# Patient Record
Sex: MALE | Race: OTHER | NOT HISPANIC OR LATINO | URBAN - METROPOLITAN AREA
[De-identification: names, ages, dates, MRNs, and addresses within clinical notes are randomized per-mention and may not be internally consistent; named-entity substitution may affect disease eponyms.]

---

## 2017-09-08 ENCOUNTER — EMERGENCY (EMERGENCY)
Facility: HOSPITAL | Age: 42
LOS: 1 days | Discharge: HOME | End: 2017-09-08
Attending: EMERGENCY MEDICINE | Admitting: EMERGENCY MEDICINE
Payer: COMMERCIAL

## 2017-09-08 VITALS
RESPIRATION RATE: 20 BRPM | DIASTOLIC BLOOD PRESSURE: 100 MMHG | OXYGEN SATURATION: 96 % | TEMPERATURE: 98 F | HEIGHT: 69 IN | SYSTOLIC BLOOD PRESSURE: 144 MMHG | HEART RATE: 113 BPM | WEIGHT: 210.1 LBS

## 2017-09-08 DIAGNOSIS — M25.60 STIFFNESS OF UNSPECIFIED JOINT, NOT ELSEWHERE CLASSIFIED: ICD-10-CM

## 2017-09-08 DIAGNOSIS — S60.221A CONTUSION OF RIGHT HAND, INITIAL ENCOUNTER: ICD-10-CM
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
## 2017-09-08 DIAGNOSIS — S00.81XA ABRASION OF OTHER PART OF HEAD, INITIAL ENCOUNTER: ICD-10-CM

## 2017-09-08 DIAGNOSIS — Z79.899 OTHER LONG TERM (CURRENT) DRUG THERAPY: ICD-10-CM
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
## 2017-09-08 PROCEDURE — 99283 EMERGENCY DEPT VISIT LOW MDM: CPT

## 2017-09-08 RX ORDER — IBUPROFEN 200 MG
1 TABLET ORAL
Qty: 30 | Refills: 0
Start: 2017-09-08

## 2017-09-08 RX ORDER — IBUPROFEN 200 MG
800 TABLET ORAL ONCE
Qty: 0 | Refills: 0 | Status: COMPLETED | OUTPATIENT
Start: 2017-09-08 | End: 2017-09-08

## 2017-09-08 RX ADMIN — Medication 800 MILLIGRAM(S): at 21:34
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
## 2017-09-08 NOTE — ED ADULT NURSE NOTE - CHPI ED SYMPTOMS NEG
no difficulty bearing weight/no stiffness/no deformity/no weakness/no bruising/no fever/no numbness/no abrasion/no back pain/no tingling
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
## 2017-09-08 NOTE — ED PROVIDER NOTE - MUSCULOSKELETAL, MLM
No midline c spine tenderness. mild swelling of R hand, FROM of all IP joints, nontender to all MCPs and all phalanges. pt able to extend wrist, perform pincher movements, intrinsic muscles of hands intact

## 2020-07-28 ENCOUNTER — INPATIENT (INPATIENT)
Facility: HOSPITAL | Age: 45
LOS: 0 days | Discharge: HOME | DRG: 489 | End: 2020-07-29
Attending: ORTHOPAEDIC SURGERY | Admitting: ORTHOPAEDIC SURGERY
Payer: COMMERCIAL

## 2020-07-28 ENCOUNTER — TRANSCRIPTION ENCOUNTER (OUTPATIENT)
Age: 45
End: 2020-07-28

## 2020-07-28 VITALS
DIASTOLIC BLOOD PRESSURE: 97 MMHG | TEMPERATURE: 99 F | HEIGHT: 69 IN | RESPIRATION RATE: 16 BRPM | HEART RATE: 104 BPM | OXYGEN SATURATION: 96 % | SYSTOLIC BLOOD PRESSURE: 153 MMHG | WEIGHT: 220.02 LBS

## 2020-07-28 DIAGNOSIS — Q68.2 CONGENITAL DEFORMITY OF KNEE: ICD-10-CM

## 2020-07-28 DIAGNOSIS — S86.811A STRAIN OF OTHER MUSCLE(S) AND TENDON(S) AT LOWER LEG LEVEL, RIGHT LEG, INITIAL ENCOUNTER: ICD-10-CM

## 2020-07-28 LAB
ALBUMIN SERPL ELPH-MCNC: 4.3 G/DL — SIGNIFICANT CHANGE UP (ref 3.3–5.2)
ALP SERPL-CCNC: 48 U/L — SIGNIFICANT CHANGE UP (ref 40–120)
ALT FLD-CCNC: 23 U/L — SIGNIFICANT CHANGE UP
ANION GAP SERPL CALC-SCNC: 14 MMOL/L — SIGNIFICANT CHANGE UP (ref 5–17)
APTT BLD: 28.1 SEC — SIGNIFICANT CHANGE UP (ref 27.5–35.5)
AST SERPL-CCNC: 23 U/L — SIGNIFICANT CHANGE UP
BASOPHILS # BLD AUTO: 0.04 K/UL — SIGNIFICANT CHANGE UP (ref 0–0.2)
BASOPHILS NFR BLD AUTO: 0.5 % — SIGNIFICANT CHANGE UP (ref 0–2)
BILIRUB SERPL-MCNC: 0.4 MG/DL — SIGNIFICANT CHANGE UP (ref 0.4–2)
BLD GP AB SCN SERPL QL: SIGNIFICANT CHANGE UP
BUN SERPL-MCNC: 12 MG/DL — SIGNIFICANT CHANGE UP (ref 8–20)
CALCIUM SERPL-MCNC: 8.9 MG/DL — SIGNIFICANT CHANGE UP (ref 8.6–10.2)
CHLORIDE SERPL-SCNC: 99 MMOL/L — SIGNIFICANT CHANGE UP (ref 98–107)
CO2 SERPL-SCNC: 24 MMOL/L — SIGNIFICANT CHANGE UP (ref 22–29)
CREAT SERPL-MCNC: 1.15 MG/DL — SIGNIFICANT CHANGE UP (ref 0.5–1.3)
EOSINOPHIL # BLD AUTO: 0.08 K/UL — SIGNIFICANT CHANGE UP (ref 0–0.5)
EOSINOPHIL NFR BLD AUTO: 1 % — SIGNIFICANT CHANGE UP (ref 0–6)
GLUCOSE SERPL-MCNC: 98 MG/DL — SIGNIFICANT CHANGE UP (ref 70–99)
HCT VFR BLD CALC: 49.2 % — SIGNIFICANT CHANGE UP (ref 39–50)
HGB BLD-MCNC: 16.1 G/DL — SIGNIFICANT CHANGE UP (ref 13–17)
IMM GRANULOCYTES NFR BLD AUTO: 0.1 % — SIGNIFICANT CHANGE UP (ref 0–1.5)
INR BLD: 1.01 RATIO — SIGNIFICANT CHANGE UP (ref 0.88–1.16)
LYMPHOCYTES # BLD AUTO: 1.72 K/UL — SIGNIFICANT CHANGE UP (ref 1–3.3)
LYMPHOCYTES # BLD AUTO: 21.4 % — SIGNIFICANT CHANGE UP (ref 13–44)
MCHC RBC-ENTMCNC: 27.2 PG — SIGNIFICANT CHANGE UP (ref 27–34)
MCHC RBC-ENTMCNC: 32.7 GM/DL — SIGNIFICANT CHANGE UP (ref 32–36)
MCV RBC AUTO: 83 FL — SIGNIFICANT CHANGE UP (ref 80–100)
MONOCYTES # BLD AUTO: 0.68 K/UL — SIGNIFICANT CHANGE UP (ref 0–0.9)
MONOCYTES NFR BLD AUTO: 8.5 % — SIGNIFICANT CHANGE UP (ref 2–14)
NEUTROPHILS # BLD AUTO: 5.49 K/UL — SIGNIFICANT CHANGE UP (ref 1.8–7.4)
NEUTROPHILS NFR BLD AUTO: 68.5 % — SIGNIFICANT CHANGE UP (ref 43–77)
PLATELET # BLD AUTO: 246 K/UL — SIGNIFICANT CHANGE UP (ref 150–400)
POTASSIUM SERPL-MCNC: 3.8 MMOL/L — SIGNIFICANT CHANGE UP (ref 3.5–5.3)
POTASSIUM SERPL-SCNC: 3.8 MMOL/L — SIGNIFICANT CHANGE UP (ref 3.5–5.3)
PROT SERPL-MCNC: 6.9 G/DL — SIGNIFICANT CHANGE UP (ref 6.6–8.7)
PROTHROM AB SERPL-ACNC: 11.7 SEC — SIGNIFICANT CHANGE UP (ref 10.6–13.6)
RBC # BLD: 5.93 M/UL — HIGH (ref 4.2–5.8)
RBC # FLD: 15 % — HIGH (ref 10.3–14.5)
SARS-COV-2 RNA SPEC QL NAA+PROBE: SIGNIFICANT CHANGE UP
SODIUM SERPL-SCNC: 137 MMOL/L — SIGNIFICANT CHANGE UP (ref 135–145)
WBC # BLD: 8.02 K/UL — SIGNIFICANT CHANGE UP (ref 3.8–10.5)
WBC # FLD AUTO: 8.02 K/UL — SIGNIFICANT CHANGE UP (ref 3.8–10.5)

## 2020-07-28 PROCEDURE — 99222 1ST HOSP IP/OBS MODERATE 55: CPT | Mod: 57

## 2020-07-28 PROCEDURE — 73562 X-RAY EXAM OF KNEE 3: CPT | Mod: 26,RT

## 2020-07-28 PROCEDURE — 99285 EMERGENCY DEPT VISIT HI MDM: CPT

## 2020-07-28 PROCEDURE — 93010 ELECTROCARDIOGRAM REPORT: CPT

## 2020-07-28 PROCEDURE — 71045 X-RAY EXAM CHEST 1 VIEW: CPT | Mod: 26

## 2020-07-28 RX ORDER — SODIUM CHLORIDE 9 MG/ML
1000 INJECTION, SOLUTION INTRAVENOUS
Refills: 0 | Status: DISCONTINUED | OUTPATIENT
Start: 2020-07-28 | End: 2020-07-29

## 2020-07-28 RX ORDER — OXYCODONE AND ACETAMINOPHEN 5; 325 MG/1; MG/1
1 TABLET ORAL ONCE
Refills: 0 | Status: DISCONTINUED | OUTPATIENT
Start: 2020-07-28 | End: 2020-07-28

## 2020-07-28 RX ORDER — DIPHENHYDRAMINE HCL 50 MG
25 CAPSULE ORAL EVERY 4 HOURS
Refills: 0 | Status: DISCONTINUED | OUTPATIENT
Start: 2020-07-28 | End: 2020-07-29

## 2020-07-28 RX ORDER — CEFAZOLIN SODIUM 1 G
2000 VIAL (EA) INJECTION ONCE
Refills: 0 | Status: COMPLETED | OUTPATIENT
Start: 2020-07-29 | End: 2020-07-29

## 2020-07-28 RX ORDER — OXYCODONE HYDROCHLORIDE 5 MG/1
10 TABLET ORAL
Refills: 0 | Status: DISCONTINUED | OUTPATIENT
Start: 2020-07-28 | End: 2020-07-29

## 2020-07-28 RX ORDER — OXYCODONE HYDROCHLORIDE 5 MG/1
5 TABLET ORAL
Refills: 0 | Status: DISCONTINUED | OUTPATIENT
Start: 2020-07-28 | End: 2020-07-29

## 2020-07-28 RX ORDER — KETOROLAC TROMETHAMINE 30 MG/ML
30 SYRINGE (ML) INJECTION ONCE
Refills: 0 | Status: DISCONTINUED | OUTPATIENT
Start: 2020-07-28 | End: 2020-07-28

## 2020-07-28 RX ORDER — KETOROLAC TROMETHAMINE 30 MG/ML
60 SYRINGE (ML) INJECTION ONCE
Refills: 0 | Status: DISCONTINUED | OUTPATIENT
Start: 2020-07-28 | End: 2020-07-28

## 2020-07-28 RX ADMIN — Medication 60 MILLIGRAM(S): at 21:10

## 2020-07-28 RX ADMIN — OXYCODONE AND ACETAMINOPHEN 1 TABLET(S): 5; 325 TABLET ORAL at 21:11

## 2020-07-28 RX ADMIN — Medication 30 MILLIGRAM(S): at 21:10

## 2020-07-28 RX ADMIN — Medication 25 MILLIGRAM(S): at 21:20

## 2020-07-28 NOTE — ED PROVIDER NOTE - EXTREMITY EXAM
Rt knee swelling with mild ecchymosis, ttp ROM limited to pain, patella palpable in superior position

## 2020-07-28 NOTE — ED ADULT TRIAGE NOTE - CHIEF COMPLAINT QUOTE
"I was trying to get away from a bee and I slammed my knee into a boat trailer and dislocated it." Received patient at 1835 on stretcher on 3 liters of O2 via nc v/s obtained, patient denies pain and discomfort at present time. Safety maintained, will monitor.

## 2020-07-28 NOTE — H&P ADULT - ATTENDING COMMENTS
Orthopaedic Trauma Surgeon Addendum:    I have personally performed a face-to-face diagnostic evaluation on this patient.  I have reviewed the physician assistant note and agree with the history, exam, and plan of care, except as noted.    Orthopedic Surgery is ready to proceed with surgery pending medical optimization and adequate Operating Room availability. Risks of surgical delay discussed with other providers and staff. Please call with any questions or concerns.     Josr Breaux MD  Orthopaedic Trauma Surgeon  [facility] [place]

## 2020-07-28 NOTE — ED PROVIDER NOTE - ATTENDING CONTRIBUTION TO CARE
46 yo M [employer]  presents to ED c/o R knee pain and swelling after hitting trailer after getting stung by a bee to R periorbital area.  On exam R knee with high riding patella with increased effusion limited and decreased pain on AROM, NVI.  + superficial R periorbital bite.  X-rays with high riding patella.  Pt seen by Ortho and pt to be admitted for repair tomorrow

## 2020-07-28 NOTE — ED PROVIDER NOTE - CLINICAL SUMMARY MEDICAL DECISION MAKING FREE TEXT BOX
Pt with likely patella tendon rupture seen by ortho that wants to have Pt admitted to their service for further evaluation and possible surgical intervention, pt made aware of need for admission and possible further treatments, pt states that they agree with need for admission and they understand all results and possible treatments. PT will be admitted to ortho team and care will be transferred to admitting team.

## 2020-07-28 NOTE — H&P ADULT - HISTORY OF PRESENT ILLNESS
Pt Name: JAMES LAROSE    MRN: [id number]      Patient is a 45y Male presenting to the emergency department with a chief complaint of right knee pain s/p fall today. [place] bee causing him to run into a post and fall onto his right knee. Pt denies numbness/tingling and has no other complaints.    REVIEW OF SYSTEMS    General: Alert, responsive, in NAD    Skin/Breast: No rashes, no pruritis   	  Ophthalmologic: No visual changes. No redness.   	  ENMT:	No discharge. No swelling.    Respiratory and Thorax: No difficulty breathing. No cough.  	   Cardiovascular:	No chest pain. No palpitations.    Gastrointestinal:	 No abdominal pain. No diarrhea.     Genitourinary: No dysuria. No bleeding.    Musculoskeletal: SEE HPI.    Neurological: No sensory or motor changes.     Psychiatric: No anxiety or depression.    Hematology/Lymphatics: No swelling.    Endocrine: No Hx of diabetes.    ROS is otherwise negative.    PAST MEDICAL & SURGICAL HISTORY:  PAST MEDICAL & SURGICAL HISTORY:  No pertinent past medical history  No significant past surgical history      Allergies: No Known Allergies      Medications: diphenhydrAMINE 25 milliGRAM(s) Oral every 4 hours PRN      FAMILY HISTORY:  : non-contributory    Social History: Denies ETOH abuse    Ambulation: Walking independently [ x ] With Cane [ ] With Walker [ ]  Bedbound [ ]                           16.1   8.02  )-----------( 246      ( 28 Jul 2020 21:15 )             49.2       07-28    137  |  99  |  12.0  ----------------------------<  98  3.8   |  24.0  |  1.15    Ca    8.9      28 Jul 2020 21:15    TPro  6.9  /  Alb  4.3  /  TBili  0.4  /  DBili  x   /  AST  23  /  ALT  23  /  AlkPhos  48  07-28      Vital Signs Last 24 Hrs  T(C): 37.1 (28 Jul 2020 19:55), Max: 37.1 (28 Jul 2020 19:55)  T(F): 98.7 (28 Jul 2020 19:55), Max: 98.7 (28 Jul 2020 19:55)  HR: 104 (28 Jul 2020 19:55) (104 - 104)  BP: 153/97 (28 Jul 2020 19:55) (153/97 - 153/97)  BP(mean): --  RR: 16 (28 Jul 2020 19:55) (16 - 16)  SpO2: 96% (28 Jul 2020 19:55) (96% - 96%)    Daily Height in cm: 175.26 (28 Jul 2020 19:55)    Daily       PHYSICAL EXAM:      Appearance: Alert, responsive, in no acute distress.    Neurological: Sensation is grossly intact to light touch. 5/5 motor function of all extremities. No focal deficits or weaknesses found.    Skin: no rash on visible skin. Skin is clean, dry and intact. No bleeding. No abrasions. No ulcerations.    Vascular: 2+ distal DP/PT/radial pulses. Cap refill < 2 sec. No signs of venous insufficiency or stasis. No extremity ulcerations. No cyanosis.    Musculoskeletal:         Left Upper Extremity:  + NROM. Non-tender. No signs of trauma.        Right Upper Extremity:  + NROM. Non-tender. No signs of trauma.        Left Lower Extremity:  + NROM. Non-tender. No signs of trauma.        Right Lower Extremity: +swelling/TTP of the right knee with high riding patella palpated, +deficit at the patella tendon, pt unable to SLR, 2+ DP pulse palpated, SILT, +plantar/dorsiflexion/EHL/FHL intact, compartments soft and compressible.    Imaging Studies: < from: Xray Knee 3 Views, Right (07.28.20 @ 20:21) >     EXAM:  XR KNEE 3 VIEWS RT                          PROCEDURE DATE:  07/28/2020          INTERPRETATION:  CLINICAL HISTORY:Injury with  RIGHT knee pain.    TECHNIQUE: AP, lateral, radiographs    FINDINGS: There is superior lateral patellar displacement which may indicate there is no subluxation or patella matt. There is lateral soft tissue swelling. The bone mineralization is normal. There is no fracture. The joint spaces are unremarkable. No osseous lesion is noted. There is no effusion.  IMPRESSION:  Lateral soft tissue swelling.  Superior displacement of the patella as described.  No acute fracture              AVERY TAPIA M.D., ATTENDING RADIOLOGIST  This document has been electronically signed. Jul 28 2020  8:55PM        < end of copied text >      A/P:  Pt is a  45y Male with a right patella tendon rupture s/p fall today.    PLAN d/w Dr. Breaux:   * NPO for OR tomorrow  * IV fluids ordered and to start once NPO  * Pre-operative ABX ordered  * Bed rest Pt Name: JAMES LAROSE    MRN: [id number]      Patient is a 45y Male presenting to the emergency department with a chief complaint of right knee pain s/p fall today. [place] by a bee causing him to run into a post and fall onto his right knee. Pt denies numbness/tingling and has no other complaints.    REVIEW OF SYSTEMS    General: Alert, responsive, in NAD    Skin/Breast: No rashes, no pruritis   	  Ophthalmologic: No visual changes. No redness.   	  ENMT:	No discharge. No swelling.    Respiratory and Thorax: No difficulty breathing. No cough.  	   Cardiovascular:	No chest pain. No palpitations.    Gastrointestinal:	 No abdominal pain. No diarrhea.     Genitourinary: No dysuria. No bleeding.    Musculoskeletal: SEE HPI.    Neurological: No sensory or motor changes.     Psychiatric: No anxiety or depression.    Hematology/Lymphatics: No swelling.    Endocrine: No Hx of diabetes.    ROS is otherwise negative.    PAST MEDICAL & SURGICAL HISTORY:  PAST MEDICAL & SURGICAL HISTORY:  No pertinent past medical history  No significant past surgical history      Allergies: No Known Allergies      Medications: diphenhydrAMINE 25 milliGRAM(s) Oral every 4 hours PRN      FAMILY HISTORY:  : non-contributory    Social History: Denies ETOH abuse    Ambulation: Walking independently [ x ] With Cane [ ] With Walker [ ]  Bedbound [ ]                           16.1   8.02  )-----------( 246      ( 28 Jul 2020 21:15 )             49.2       07-28    137  |  99  |  12.0  ----------------------------<  98  3.8   |  24.0  |  1.15    Ca    8.9      28 Jul 2020 21:15    TPro  6.9  /  Alb  4.3  /  TBili  0.4  /  DBili  x   /  AST  23  /  ALT  23  /  AlkPhos  48  07-28      Vital Signs Last 24 Hrs  T(C): 37.1 (28 Jul 2020 19:55), Max: 37.1 (28 Jul 2020 19:55)  T(F): 98.7 (28 Jul 2020 19:55), Max: 98.7 (28 Jul 2020 19:55)  HR: 104 (28 Jul 2020 19:55) (104 - 104)  BP: 153/97 (28 Jul 2020 19:55) (153/97 - 153/97)  BP(mean): --  RR: 16 (28 Jul 2020 19:55) (16 - 16)  SpO2: 96% (28 Jul 2020 19:55) (96% - 96%)    Daily Height in cm: 175.26 (28 Jul 2020 19:55)    Daily       PHYSICAL EXAM:      Appearance: Alert, responsive, in no acute distress.    Neurological: Sensation is grossly intact to light touch. 5/5 motor function of all extremities. No focal deficits or weaknesses found.    Skin: no rash on visible skin. Skin is clean, dry and intact. No bleeding. No abrasions. No ulcerations.    Vascular: 2+ distal DP/PT/radial pulses. Cap refill < 2 sec. No signs of venous insufficiency or stasis. No extremity ulcerations. No cyanosis.    Musculoskeletal:         Left Upper Extremity:  + NROM. Non-tender. No signs of trauma.        Right Upper Extremity:  + NROM. Non-tender. No signs of trauma.        Left Lower Extremity:  + NROM. Non-tender. No signs of trauma.        Right Lower Extremity: +swelling/TTP of the right knee with high riding patella palpated, +deficit at the patella tendon, pt unable to SLR, 2+ DP pulse palpated, SILT, +plantar/dorsiflexion/EHL/FHL intact, compartments soft and compressible.    Imaging Studies: < from: Xray Knee 3 Views, Right (07.28.20 @ 20:21) >     EXAM:  XR KNEE 3 VIEWS RT                          PROCEDURE DATE:  07/28/2020          INTERPRETATION:  CLINICAL HISTORY:Injury with  RIGHT knee pain.    TECHNIQUE: AP, lateral, radiographs    FINDINGS: There is superior lateral patellar displacement which may indicate there is no subluxation or patella matt. There is lateral soft tissue swelling. The bone mineralization is normal. There is no fracture. The joint spaces are unremarkable. No osseous lesion is noted. There is no effusion.  IMPRESSION:  Lateral soft tissue swelling.  Superior displacement of the patella as described.  No acute fracture              AVERY TAPIA M.D., ATTENDING RADIOLOGIST  This document has been electronically signed. Jul 28 2020  8:55PM        < end of copied text >      A/P:  Pt is a  45y Male with a right patella tendon rupture s/p fall today.    PLAN d/w Dr. Breaux:   * NPO for OR tomorrow  * IV fluids ordered and to start once NPO  * Pre-operative ABX ordered  * Knee immobilizer of the RLE- applied by ED PA  * Bed rest Pt Name: JAMES LAROSE    MRN: [id number]      Patient is a 45 year old Male presenting to the emergency department with a chief complaint of right knee pain s/p fall today. [place] by a bee causing him to run into a post and fall onto his right knee. Pt denies numbness/tingling and has no other complaints.    REVIEW OF SYSTEMS    General: Alert, responsive, in NAD    Skin/Breast: No rashes, no pruritis   	  Ophthalmologic: No visual changes. No redness.   	  ENMT:	No discharge. No swelling.    Respiratory and Thorax: No difficulty breathing. No cough.  	   Cardiovascular:	No chest pain. No palpitations.    Gastrointestinal:	 No abdominal pain. No diarrhea.     Genitourinary: No dysuria. No bleeding.    Musculoskeletal: SEE HPI.    Neurological: No sensory or motor changes.     Psychiatric: No anxiety or depression.    Hematology/Lymphatics: No swelling.    Endocrine: No Hx of diabetes.    ROS is otherwise negative.    PAST MEDICAL & SURGICAL HISTORY:  PAST MEDICAL & SURGICAL HISTORY:  No pertinent past medical history  No significant past surgical history      Allergies: No Known Allergies      Medications: diphenhydrAMINE 25 milliGRAM(s) Oral every 4 hours PRN      FAMILY HISTORY:  : non-contributory    Social History: Denies ETOH abuse    Ambulation: Walking independently [ x ] With Cane [ ] With Walker [ ]  Bedbound [ ]                           16.1   8.02  )-----------( 246      ( 28 Jul 2020 21:15 )             49.2       07-28    137  |  99  |  12.0  ----------------------------<  98  3.8   |  24.0  |  1.15    Ca    8.9      28 Jul 2020 21:15    TPro  6.9  /  Alb  4.3  /  TBili  0.4  /  DBili  x   /  AST  23  /  ALT  23  /  AlkPhos  48  07-28      Vital Signs Last 24 Hrs  T(C): 37.1 (28 Jul 2020 19:55), Max: 37.1 (28 Jul 2020 19:55)  T(F): 98.7 (28 Jul 2020 19:55), Max: 98.7 (28 Jul 2020 19:55)  HR: 104 (28 Jul 2020 19:55) (104 - 104)  BP: 153/97 (28 Jul 2020 19:55) (153/97 - 153/97)  BP(mean): --  RR: 16 (28 Jul 2020 19:55) (16 - 16)  SpO2: 96% (28 Jul 2020 19:55) (96% - 96%)    Daily Height in cm: 175.26 (28 Jul 2020 19:55)    Daily       PHYSICAL EXAM:      Appearance: Alert, responsive, in no acute distress.    Neurological: Sensation is grossly intact to light touch. 5/5 motor function of all extremities. No focal deficits or weaknesses found.    Skin: no rash on visible skin. Skin is clean, dry and intact. No bleeding. No abrasions. No ulcerations.    Vascular: 2+ distal DP/PT/radial pulses. Cap refill < 2 sec. No signs of venous insufficiency or stasis. No extremity ulcerations. No cyanosis.    Musculoskeletal:         Left Upper Extremity:  + NROM. Non-tender. No signs of trauma.        Right Upper Extremity:  + NROM. Non-tender. No signs of trauma.        Left Lower Extremity:  + NROM. Non-tender. No signs of trauma.        Right Lower Extremity: +swelling/TTP of the right knee with high riding patella palpated, +deficit at the patella tendon, pt unable to SLR, 2+ DP pulse palpated, SILT, +plantar/dorsiflexion/EHL/FHL intact, compartments soft and compressible.    Imaging Studies: < from: Xray Knee 3 Views, Right (07.28.20 @ 20:21) >     EXAM:  XR KNEE 3 VIEWS RT                          PROCEDURE DATE:  07/28/2020          INTERPRETATION:  CLINICAL HISTORY:Injury with  RIGHT knee pain.    TECHNIQUE: AP, lateral, radiographs    FINDINGS: There is superior lateral patellar displacement which may indicate there is no subluxation or patella matt. There is lateral soft tissue swelling. The bone mineralization is normal. There is no fracture. The joint spaces are unremarkable. No osseous lesion is noted. There is no effusion.  IMPRESSION:  Lateral soft tissue swelling.  Superior displacement of the patella as described.  No acute fracture              AVERY TAPIA M.D., ATTENDING RADIOLOGIST  This document has been electronically signed. Jul 28 2020  8:55PM        < end of copied text >      A/P:  Pt is a  45y Male with a right patella tendon rupture s/p fall today.    PLAN d/w Dr. Breaux:   * NPO for OR tomorrow  * IV fluids ordered and to start once NPO  * Pre-operative ABX ordered  * Knee immobilizer of the RLE- applied by ED PA  * Bed rest

## 2020-07-28 NOTE — ED PROVIDER NOTE - OBJECTIVE STATEMENT
PT with no SPMHx presents to the ED with complaint of Rt knee pain that started today. Pt states that he was at work today when he was stung by a bee turned and hit his knee into a boat trailer. Pt states that he had a sudden onset of sever pain that is non radiating on the Rt knee that is made worse with movement improved by nothing described as soreness that is constat. Pt dines numbness, tingling, loss of sensation, HA, dizziness, back pain, SOB, diff breathing.

## 2020-07-29 ENCOUNTER — TRANSCRIPTION ENCOUNTER (OUTPATIENT)
Age: 45
End: 2020-07-29

## 2020-07-29 VITALS
DIASTOLIC BLOOD PRESSURE: 81 MMHG | OXYGEN SATURATION: 91 % | TEMPERATURE: 98 F | SYSTOLIC BLOOD PRESSURE: 154 MMHG | HEART RATE: 79 BPM

## 2020-07-29 LAB
ABO RH CONFIRMATION: SIGNIFICANT CHANGE UP
SARS-COV-2 IGG SERPL QL IA: NEGATIVE — SIGNIFICANT CHANGE UP
SARS-COV-2 IGM SERPL IA-ACNC: 0.07 INDEX — SIGNIFICANT CHANGE UP

## 2020-07-29 PROCEDURE — 97163 PT EVAL HIGH COMPLEX 45 MIN: CPT

## 2020-07-29 PROCEDURE — 85027 COMPLETE CBC AUTOMATED: CPT

## 2020-07-29 PROCEDURE — 73560 X-RAY EXAM OF KNEE 1 OR 2: CPT | Mod: 26,RT

## 2020-07-29 PROCEDURE — 73560 X-RAY EXAM OF KNEE 1 OR 2: CPT

## 2020-07-29 PROCEDURE — 99285 EMERGENCY DEPT VISIT HI MDM: CPT

## 2020-07-29 PROCEDURE — 80053 COMPREHEN METABOLIC PANEL: CPT

## 2020-07-29 PROCEDURE — 27428 RECONSTRUCTION KNEE: CPT | Mod: RT

## 2020-07-29 PROCEDURE — 71045 X-RAY EXAM CHEST 1 VIEW: CPT

## 2020-07-29 PROCEDURE — 85730 THROMBOPLASTIN TIME PARTIAL: CPT

## 2020-07-29 PROCEDURE — 27599 UNLISTED PX FEMUR/KNEE: CPT

## 2020-07-29 PROCEDURE — 73562 X-RAY EXAM OF KNEE 3: CPT

## 2020-07-29 PROCEDURE — V2790: CPT

## 2020-07-29 PROCEDURE — 87635 SARS-COV-2 COVID-19 AMP PRB: CPT

## 2020-07-29 PROCEDURE — 27380 REPAIR OF KNEECAP TENDON: CPT | Mod: RT

## 2020-07-29 PROCEDURE — 93005 ELECTROCARDIOGRAM TRACING: CPT

## 2020-07-29 PROCEDURE — 85610 PROTHROMBIN TIME: CPT

## 2020-07-29 PROCEDURE — 86850 RBC ANTIBODY SCREEN: CPT

## 2020-07-29 PROCEDURE — 36415 COLL VENOUS BLD VENIPUNCTURE: CPT

## 2020-07-29 PROCEDURE — 86769 SARS-COV-2 COVID-19 ANTIBODY: CPT

## 2020-07-29 PROCEDURE — 86901 BLOOD TYPING SEROLOGIC RH(D): CPT

## 2020-07-29 PROCEDURE — 86900 BLOOD TYPING SEROLOGIC ABO: CPT

## 2020-07-29 RX ORDER — ONDANSETRON 8 MG/1
4 TABLET, FILM COATED ORAL ONCE
Refills: 0 | Status: DISCONTINUED | OUTPATIENT
Start: 2020-07-29 | End: 2020-07-29

## 2020-07-29 RX ORDER — ASPIRIN/CALCIUM CARB/MAGNESIUM 324 MG
325 TABLET ORAL DAILY
Refills: 0 | Status: DISCONTINUED | OUTPATIENT
Start: 2020-07-30 | End: 2020-07-29

## 2020-07-29 RX ORDER — SODIUM CHLORIDE 9 MG/ML
1000 INJECTION, SOLUTION INTRAVENOUS
Refills: 0 | Status: DISCONTINUED | OUTPATIENT
Start: 2020-07-29 | End: 2020-07-29

## 2020-07-29 RX ORDER — SENNOSIDES/DOCUSATE SODIUM 8.6MG-50MG
2 TABLET ORAL
Qty: 28 | Refills: 0
Start: 2020-07-29 | End: 2020-08-11

## 2020-07-29 RX ORDER — ACETAMINOPHEN 500 MG
650 TABLET ORAL EVERY 6 HOURS
Refills: 0 | Status: DISCONTINUED | OUTPATIENT
Start: 2020-07-29 | End: 2020-07-29

## 2020-07-29 RX ORDER — HYDROMORPHONE HYDROCHLORIDE 2 MG/ML
0.5 INJECTION INTRAMUSCULAR; INTRAVENOUS; SUBCUTANEOUS
Refills: 0 | Status: DISCONTINUED | OUTPATIENT
Start: 2020-07-29 | End: 2020-07-29

## 2020-07-29 RX ORDER — CEFAZOLIN SODIUM 1 G
2000 VIAL (EA) INJECTION
Refills: 0 | Status: DISCONTINUED | OUTPATIENT
Start: 2020-07-29 | End: 2020-07-29

## 2020-07-29 RX ORDER — ASPIRIN/CALCIUM CARB/MAGNESIUM 324 MG
1 TABLET ORAL
Qty: 30 | Refills: 0
Start: 2020-07-29 | End: 2020-08-27

## 2020-07-29 RX ORDER — OXYCODONE HYDROCHLORIDE 5 MG/1
1 TABLET ORAL
Qty: 20 | Refills: 0
Start: 2020-07-29 | End: 2020-08-02

## 2020-07-29 RX ORDER — ACETAMINOPHEN 500 MG
1000 TABLET ORAL ONCE
Refills: 0 | Status: COMPLETED | OUTPATIENT
Start: 2020-07-29 | End: 2020-07-29

## 2020-07-29 RX ADMIN — Medication 100 MILLIGRAM(S): at 07:45

## 2020-07-29 RX ADMIN — OXYCODONE HYDROCHLORIDE 5 MILLIGRAM(S): 5 TABLET ORAL at 10:58

## 2020-07-29 RX ADMIN — SODIUM CHLORIDE 80 MILLILITER(S): 9 INJECTION, SOLUTION INTRAVENOUS at 14:25

## 2020-07-29 RX ADMIN — OXYCODONE HYDROCHLORIDE 10 MILLIGRAM(S): 5 TABLET ORAL at 16:30

## 2020-07-29 RX ADMIN — Medication 400 MILLIGRAM(S): at 12:00

## 2020-07-29 RX ADMIN — OXYCODONE HYDROCHLORIDE 5 MILLIGRAM(S): 5 TABLET ORAL at 12:16

## 2020-07-29 RX ADMIN — SODIUM CHLORIDE 75 MILLILITER(S): 9 INJECTION, SOLUTION INTRAVENOUS at 10:58

## 2020-07-29 RX ADMIN — Medication 100 MILLIGRAM(S): at 14:28

## 2020-07-29 RX ADMIN — SODIUM CHLORIDE 80 MILLILITER(S): 9 INJECTION, SOLUTION INTRAVENOUS at 00:13

## 2020-07-29 RX ADMIN — OXYCODONE HYDROCHLORIDE 5 MILLIGRAM(S): 5 TABLET ORAL at 01:52

## 2020-07-29 NOTE — PHYSICAL THERAPY INITIAL EVALUATION ADULT - PERTINENT HX OF CURRENT PROBLEM, REHAB EVAL
knee pain s/p fall today. [place] by a bee causing him to run into a post and fall onto his right knee

## 2020-07-29 NOTE — PHYSICAL THERAPY INITIAL EVALUATION ADULT - CRITERIA FOR SKILLED THERAPEUTIC INTERVENTIONS
Pt is modified independent with axillary crutches with mobility, no inpatient PT needs required at this time, PT will no longer continue to follow.

## 2020-07-29 NOTE — DISCHARGE NOTE PROVIDER - HOSPITAL COURSE
The patient underwent a right patella tendon repair for treatment of right patella tendon rupture on 7/29/20. on  The patient received antibiotics consistent with SCIP guidelines. The patient was medically cleared and underwent the procedure and had no intra-operative complications. Post-operatively, the patient was seen by medicine and PT. The patient received ASPIRIN for DVTP. The patient received pain medications per orthopedic pain management pathway and the pain was appropriately controlled. Patient was evaluated by PT and instructed on gait training. The patient is WBAT of the right lower extremity. The patient did not have any post-operative medical complications. The patient was discharged in stable condition.

## 2020-07-29 NOTE — DISCHARGE NOTE PROVIDER - NSDCCPCAREPLAN_GEN_ALL_CORE_FT
PRINCIPAL DISCHARGE DIAGNOSIS  Diagnosis: Patellar tendon rupture, right, initial encounter  Assessment and Plan of Treatment: Patellar tendon rupture, right, initial encounter

## 2020-07-29 NOTE — DISCHARGE NOTE PROVIDER - CARE PROVIDER_API CALL
Josr Breaux  ORTHOPAEDIC SURGERY  [address] [place] [address]  Phone: [phone]  Fax: [phone]  Follow Up Time:

## 2020-07-29 NOTE — DISCHARGE NOTE PROVIDER - NSDCACTIVITY_GEN_ALL_CORE
Do not make important decisions/Walking - Outdoors allowed/Do not drive or operate machinery/Walking - Indoors allowed/No heavy lifting/straining

## 2020-07-29 NOTE — BRIEF OPERATIVE NOTE - COMMENTS
post-op plan: WBAT in immobilizer, PT, ancef per SCIP, post-op ASA, f/u ortho 3 weeks to start PT protocol

## 2020-07-29 NOTE — PHYSICAL THERAPY INITIAL EVALUATION ADULT - DID THE PATIENT HAVE SURGERY?
yes/Arthroscopy of knee with reconstruction of medial patellofemoral ligament (MPFL) using hamstring graft, Patellar tendon repair

## 2020-07-29 NOTE — PHYSICAL THERAPY INITIAL EVALUATION ADULT - RANGE OF MOTION EXAMINATION, REHAB EVAL
bilateral upper extremity ROM was WNL (within normal limits)/Left LE ROM was WNL (within normal limits)/Right Knee AROM limited due to knee immobilizer, otherwise WNL.

## 2020-07-29 NOTE — BRIEF OPERATIVE NOTE - NSICDXBRIEFPROCEDURE_GEN_ALL_CORE_FT
PROCEDURES:  Arthroscopy of knee with reconstruction of medial patellofemoral ligament (MPFL) using hamstring graft 29-Jul-2020 09:27:19  Josr Breaux  Excision, osteophyte, knee 29-Jul-2020 09:25:29  Josr Breaux  Patellar tendon repair 29-Jul-2020 09:24:42  Josr Breaux

## 2020-07-29 NOTE — DISCHARGE NOTE PROVIDER - NSDCMRMEDTOKEN_GEN_ALL_CORE_FT
Aspirin Enteric Coated 325 mg oral delayed release tablet: 1 tab(s) orally once a day   oxyCODONE 5 mg oral tablet: 1 tab(s) orally every 6 hours -for moderate to severe  pain MDD:4   Senna S 50 mg-8.6 mg oral tablet: 2 tab(s) orally once a day

## 2020-07-29 NOTE — DISCHARGE NOTE NURSING/CASE MANAGEMENT/SOCIAL WORK - PATIENT PORTAL LINK FT
You can access the FollowMyHealth Patient Portal offered by [facility] by registering at the following website: [email or web address]. By joining [facility]’s FollowMyHealth portal, you will also be able to view your health information using other applications (apps) compatible with our system.

## 2020-07-29 NOTE — PHYSICAL THERAPY INITIAL EVALUATION ADULT - ADDITIONAL COMMENTS
Pt reports living in private home with his wife & kids. They are available to assist if needed. Pt has 3 steps to enter and resides on the first floor. Pt owns a shower chair. Pt drives & works. Independent prior to admission.

## 2020-08-10 ENCOUNTER — APPOINTMENT (OUTPATIENT)
Dept: ORTHOPEDIC SURGERY | Facility: CLINIC | Age: 45
End: 2020-08-10
Payer: OTHER MISCELLANEOUS

## 2020-08-10 VITALS — TEMPERATURE: 97.4 F

## 2020-08-10 VITALS
HEART RATE: 71 BPM | WEIGHT: 220 LBS | HEIGHT: 69 IN | BODY MASS INDEX: 32.58 KG/M2 | DIASTOLIC BLOOD PRESSURE: 100 MMHG | SYSTOLIC BLOOD PRESSURE: 149 MMHG

## 2020-08-10 DIAGNOSIS — M25.561 PAIN IN RIGHT KNEE: ICD-10-CM

## 2020-08-10 PROCEDURE — 99024 POSTOP FOLLOW-UP VISIT: CPT

## 2020-08-10 PROCEDURE — 73560 X-RAY EXAM OF KNEE 1 OR 2: CPT | Mod: RT

## 2020-08-10 RX ORDER — HYDROCODONE BITARTRATE AND ACETAMINOPHEN 5; 325 MG/1; MG/1
5-325 TABLET ORAL
Qty: 15 | Refills: 0 | Status: ACTIVE | COMMUNITY
Start: 2020-08-10 | End: 1900-01-01

## 2020-08-13 NOTE — HISTORY OF PRESENT ILLNESS
[___ Weeks Post Op] : [unfilled] weeks post op [Clean/Dry/Intact] : clean, dry and intact [Neuro Intact] : an unremarkable neurological exam [Doing Well] : is doing well [Vascular Intact] : ~T peripheral vascular exam normal [Adequate Pain Control] : has adequate pain control [No Sign of Infection] : is showing no signs of infection [Chills] : no chills [Fever] : no fever [Discharge] : absent of discharge [Erythema] : not erythematous [Swelling] : not swollen [Dehiscence] : not dehisced [de-identified] :  Right patellar tendon tear.\par  Right medial patellofemoral ligament tear.\par  Patellofemoral joint arthritis.DOS:07/29/2020\par  [de-identified] : Knee immobilizer and dressing removed.  Croton brace applied locked in extension. [de-identified] : 46 yo M s/p  Right patellar tendon tear.\par  Right medial patellofemoral ligament tear.\par  Patellofemoral joint arthritis.DOS:07/29/2020 here for pop visit.  Patient is doing well.  He denies pain at this time.  He has been compliant with knee immobilizer.  He has not started physical therpay yet. [de-identified] : xrays right knee 2 views:  no fractures [de-identified] : Patient will continue with kiley brace and start PT with Patella tendon repair protocol.  He will call office for any issues, otherwise he will return to office in 1 month for eval.

## 2020-09-08 ENCOUNTER — APPOINTMENT (OUTPATIENT)
Dept: ORTHOPEDIC SURGERY | Facility: CLINIC | Age: 45
End: 2020-09-08
Payer: OTHER MISCELLANEOUS

## 2020-09-08 VITALS — TEMPERATURE: 98.6 F

## 2020-09-08 DIAGNOSIS — Z80.3 FAMILY HISTORY OF MALIGNANT NEOPLASM OF BREAST: ICD-10-CM

## 2020-09-08 DIAGNOSIS — S86.811D STRAIN OF OTHER MUSCLE(S) AND TENDON(S) AT LOWER LEG LEVEL, RIGHT LEG, SUBSEQUENT ENCOUNTER: ICD-10-CM

## 2020-09-08 DIAGNOSIS — Z86.39 PERSONAL HISTORY OF OTHER ENDOCRINE, NUTRITIONAL AND METABOLIC DISEASE: ICD-10-CM

## 2020-09-08 PROCEDURE — 99024 POSTOP FOLLOW-UP VISIT: CPT

## 2020-09-08 NOTE — HISTORY OF PRESENT ILLNESS
[Excellent Pain Control] : has excellent pain control [Doing Well] : is doing well [de-identified] : s/p repair of right patellar tendon with allograft. Right medial patellofemoral ligament repair. Osteophyte excision of right patella. 7/29/2020 [No Sign of Infection] : is showing no signs of infection [de-identified] : Physical Exam:\par General: Well appearing, no acute distress, A&O\par Neurologic: A&Ox3, No focal deficits\par Head: NCAT without abrasions, lacerations, or ecchymosis to head, face, or scalp\par Respiratory: Equal chest wall expansion bilaterally, no accessory muscle use\par Lymphatic: No lymphadenopathy palpated\par Skin: Warm and dry\par Psychiatric: Normal mood and affect\par \par Knee range of motion 0-60 [de-identified] : 45 year old male s/p repair of right patellar tendon with allograft.  He is doing very well.  He is following the rehab protocol.  He has returned to work.  He is wearing his brace.  He is very happy with his recovery so far. [de-identified] : We will continue with a standard postoperative plan.  Continue rehab protocol.  Continue with neuro advance range of motion and discontinue brace when indicated.  As long as he continues to do well, he may follow-up with us PRN.  He can call for physical therapy at any time.  The patient was given the opportunity to ask questions and all questions were answered to their satisfaction.\par \par Josr Breaux MD\par Orthopaedic Trauma Surgeon\par [facility] [place]\par Director Orthopaedic Trauma, [facility]\par \par \par \par

## 2021-12-08 ENCOUNTER — TRANSCRIPTION ENCOUNTER (OUTPATIENT)
Age: 46
End: 2021-12-08

## 2023-01-12 NOTE — ED PROVIDER NOTE - CONSTITUTIONAL, MLM
normal... Well appearing, awake, alert, oriented to person, place, time/situation and in no apparent distress. Propranolol Pregnancy And Lactation Text: This medication is Pregnancy Category C and it isn't known if it is safe during pregnancy. It is excreted in breast milk.

## 2024-04-04 ENCOUNTER — EMERGENCY (EMERGENCY)
Facility: HOSPITAL | Age: 49
LOS: 1 days | Discharge: HOME | End: 2024-04-04
Attending: EMERGENCY MEDICINE
Payer: COMMERCIAL

## 2024-04-04 VITALS
HEART RATE: 117 BPM | RESPIRATION RATE: 20 BRPM | OXYGEN SATURATION: 98 % | TEMPERATURE: 98 F | DIASTOLIC BLOOD PRESSURE: 139 MMHG | SYSTOLIC BLOOD PRESSURE: 168 MMHG

## 2024-04-04 VITALS
DIASTOLIC BLOOD PRESSURE: 86 MMHG | SYSTOLIC BLOOD PRESSURE: 143 MMHG | RESPIRATION RATE: 18 BRPM | OXYGEN SATURATION: 99 % | HEART RATE: 103 BPM

## 2024-04-04 PROCEDURE — 99283 EMERGENCY DEPT VISIT LOW MDM: CPT

## 2024-04-04 RX ORDER — ALPRAZOLAM 0.25 MG
0.5 TABLET ORAL ONCE
Refills: 0 | Status: DISCONTINUED | OUTPATIENT
Start: 2024-04-04 | End: 2024-04-04

## 2024-04-04 RX ADMIN — Medication 0.5 MILLIGRAM(S): at 20:57

## 2024-04-04 NOTE — ED PROVIDER NOTE - NSFOLLOWUPINSTRUCTIONS_ED_ALL_ED_FT
- low-salt diet   please follow-up with your primary care doctor within 1 or 2 days  Hypertension    Hypertension, commonly called high blood pressure, is when the force of blood pumping through your arteries is too strong. Hypertension forces your heart to work harder to pump blood. Your arteries may become narrow or stiff. Having untreated or uncontrolled hypertension for a long period of time can cause heart attack, stroke, kidney disease, and other problems. If started on a medication, take exactly as prescribed by your health care professional. Maintain a healthy lifestyle and follow up with your primary care physician.    SEEK IMMEDIATE MEDICAL CARE IF YOU HAVE ANY OF THE FOLLOWING SYMPTOMS: severe headache, confusion, chest pain, abdominal pain, vomiting, or shortness of breath. - low-salt diet   please follow-up with your primary care doctor within  24 hours   you have a earwax in your left ear please follow-up with your ENT as you did schedule  Hypertension    Hypertension, commonly called high blood pressure, is when the force of blood pumping through your arteries is too strong. Hypertension forces your heart to work harder to pump blood. Your arteries may become narrow or stiff. Having untreated or uncontrolled hypertension for a long period of time can cause heart attack, stroke, kidney disease, and other problems. If started on a medication, take exactly as prescribed by your health care professional. Maintain a healthy lifestyle and follow up with your primary care physician.    SEEK IMMEDIATE MEDICAL CARE IF YOU HAVE ANY OF THE FOLLOWING SYMPTOMS: severe headache, confusion, chest pain, abdominal pain, vomiting, or shortness of breath.
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
## 2024-04-04 NOTE — ED PROVIDER NOTE - PHYSICAL EXAMINATION
Const: AOX3 nontoxic appearing, no apparent respiratory or physical distress. Stable gait. mild anxious    HEENT: NC/AT. Moist mucous membranes.  Eyes: LUIS ANTONIO. EOMI  Neck: Soft and supple. Full ROM without pain.  Cardiac: Regular rate and regular rhythm. +S1/S2.  Abd: Soft, non-tender, non-distended.   Neuro: Awake, alert & oriented x 3. Moves all extremities symmetrically. Const: AOX3 nontoxic appearing, no apparent respiratory or physical distress. Stable gait. mild anxious    HEENT: NC/AT. Moist mucous membranes.  Eyes: LUIS ANTONIO. EOMI- left TM within normal. right TM with impacted cerumen noted  Neck: Soft and supple. Full ROM without pain.  Cardiac: Regular rate and regular rhythm. +S1/S2.  Abd: Soft, non-tender, non-distended.   Neuro: Awake, alert & oriented x 3. Moves all extremities symmetrically.
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
## 2024-04-04 NOTE — ED ADULT NURSE NOTE - NS ED NURSE RECORD ANOTHER VITAL SIGN
[Negative] : Heme/Lymph [Joint Pain] : joint pain Yes [FreeTextEntry2] : insomnia [FreeTextEntry5] : see HPI
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
## 2024-04-04 NOTE — ED ADULT NURSE NOTE - NSFALLUNIVINTERV_ED_ALL_ED
Bed/Stretcher in lowest position, wheels locked, appropriate side rails in place/Call bell, personal items and telephone in reach/Instruct patient to call for assistance before getting out of bed/chair/stretcher/Non-slip footwear applied when patient is off stretcher/[place] to call system/Physically safe environment - no spills, clutter or unnecessary equipment/Purposeful proactive rounding/Room/bathroom lighting operational, light cord in reach

## 2025-02-10 NOTE — ED ADULT NURSE NOTE - NEURO WDL
36.9
Alert and oriented to person, place and time, memory intact, behavior appropriate to situation, PERRL.
